# Patient Record
Sex: MALE | Race: WHITE | HISPANIC OR LATINO | Employment: UNEMPLOYED | URBAN - METROPOLITAN AREA
[De-identification: names, ages, dates, MRNs, and addresses within clinical notes are randomized per-mention and may not be internally consistent; named-entity substitution may affect disease eponyms.]

---

## 2024-08-24 ENCOUNTER — HOSPITAL ENCOUNTER (EMERGENCY)
Facility: HOSPITAL | Age: 29
Discharge: HOME | End: 2024-08-25
Attending: EMERGENCY MEDICINE

## 2024-08-24 ENCOUNTER — APPOINTMENT (EMERGENCY)
Dept: CT IMAGING | Facility: HOSPITAL | Age: 29
End: 2024-08-24

## 2024-08-24 DIAGNOSIS — R41.0 CONFUSION: ICD-10-CM

## 2024-08-24 DIAGNOSIS — R20.0 RIGHT FACIAL NUMBNESS: Primary | ICD-10-CM

## 2024-08-24 LAB
ALBUMIN SERPL BCG-MCNC: 4.8 G/DL (ref 3.5–5)
ALP SERPL-CCNC: 49 U/L (ref 34–104)
ALT SERPL W P-5'-P-CCNC: 16 U/L (ref 7–52)
AMPHETAMINES SERPL QL SCN: NEGATIVE
ANION GAP SERPL CALCULATED.3IONS-SCNC: 8 MMOL/L (ref 4–13)
APTT PPP: 27 SECONDS (ref 23–34)
AST SERPL W P-5'-P-CCNC: 21 U/L (ref 13–39)
ATRIAL RATE: 102 BPM
BARBITURATES UR QL: NEGATIVE
BENZODIAZ UR QL: NEGATIVE
BILIRUB SERPL-MCNC: 0.38 MG/DL (ref 0.2–1)
BUN SERPL-MCNC: 18 MG/DL (ref 5–25)
CALCIUM SERPL-MCNC: 9.6 MG/DL (ref 8.4–10.2)
CARDIAC TROPONIN I PNL SERPL HS: <2 NG/L
CHLORIDE SERPL-SCNC: 102 MMOL/L (ref 96–108)
CO2 SERPL-SCNC: 28 MMOL/L (ref 21–32)
COCAINE UR QL: NEGATIVE
CREAT SERPL-MCNC: 1.14 MG/DL (ref 0.6–1.3)
ERYTHROCYTE [DISTWIDTH] IN BLOOD BY AUTOMATED COUNT: 12.4 % (ref 11.6–15.1)
FENTANYL UR QL SCN: NEGATIVE
FLUAV RNA RESP QL NAA+PROBE: NEGATIVE
FLUBV RNA RESP QL NAA+PROBE: NEGATIVE
GFR SERPL CREATININE-BSD FRML MDRD: 87 ML/MIN/1.73SQ M
GLUCOSE SERPL-MCNC: 103 MG/DL (ref 65–140)
GLUCOSE SERPL-MCNC: 106 MG/DL (ref 65–140)
HCT VFR BLD AUTO: 46.1 % (ref 36.5–49.3)
HGB BLD-MCNC: 15.8 G/DL (ref 12–17)
HYDROCODONE UR QL SCN: NEGATIVE
INR PPP: 0.98 (ref 0.85–1.19)
MCH RBC QN AUTO: 29.5 PG (ref 26.8–34.3)
MCHC RBC AUTO-ENTMCNC: 34.3 G/DL (ref 31.4–37.4)
MCV RBC AUTO: 86 FL (ref 82–98)
METHADONE UR QL: NEGATIVE
OPIATES UR QL SCN: NEGATIVE
OXYCODONE+OXYMORPHONE UR QL SCN: NEGATIVE
P AXIS: 72 DEGREES
PCP UR QL: NEGATIVE
PLATELET # BLD AUTO: 313 THOUSANDS/UL (ref 149–390)
PMV BLD AUTO: 9.5 FL (ref 8.9–12.7)
POTASSIUM SERPL-SCNC: 3.5 MMOL/L (ref 3.5–5.3)
PR INTERVAL: 130 MS
PROT SERPL-MCNC: 7.7 G/DL (ref 6.4–8.4)
PROTHROMBIN TIME: 13.5 SECONDS (ref 12.3–15)
QRS AXIS: 86 DEGREES
QRSD INTERVAL: 88 MS
QT INTERVAL: 332 MS
QTC INTERVAL: 432 MS
RBC # BLD AUTO: 5.36 MILLION/UL (ref 3.88–5.62)
RSV RNA RESP QL NAA+PROBE: NEGATIVE
SARS-COV-2 RNA RESP QL NAA+PROBE: NEGATIVE
SODIUM SERPL-SCNC: 138 MMOL/L (ref 135–147)
T WAVE AXIS: 17 DEGREES
THC UR QL: NEGATIVE
VENTRICULAR RATE: 102 BPM
WBC # BLD AUTO: 11.57 THOUSAND/UL (ref 4.31–10.16)

## 2024-08-24 PROCEDURE — 70496 CT ANGIOGRAPHY HEAD: CPT

## 2024-08-24 PROCEDURE — 85730 THROMBOPLASTIN TIME PARTIAL: CPT | Performed by: EMERGENCY MEDICINE

## 2024-08-24 PROCEDURE — 84484 ASSAY OF TROPONIN QUANT: CPT | Performed by: EMERGENCY MEDICINE

## 2024-08-24 PROCEDURE — 85027 COMPLETE CBC AUTOMATED: CPT | Performed by: EMERGENCY MEDICINE

## 2024-08-24 PROCEDURE — 80053 COMPREHEN METABOLIC PANEL: CPT | Performed by: EMERGENCY MEDICINE

## 2024-08-24 PROCEDURE — 0241U HB NFCT DS VIR RESP RNA 4 TRGT: CPT | Performed by: EMERGENCY MEDICINE

## 2024-08-24 PROCEDURE — 70498 CT ANGIOGRAPHY NECK: CPT

## 2024-08-24 PROCEDURE — 93005 ELECTROCARDIOGRAM TRACING: CPT

## 2024-08-24 PROCEDURE — 99285 EMERGENCY DEPT VISIT HI MDM: CPT

## 2024-08-24 PROCEDURE — 80307 DRUG TEST PRSMV CHEM ANLYZR: CPT | Performed by: EMERGENCY MEDICINE

## 2024-08-24 PROCEDURE — 82948 REAGENT STRIP/BLOOD GLUCOSE: CPT

## 2024-08-24 PROCEDURE — 85610 PROTHROMBIN TIME: CPT | Performed by: EMERGENCY MEDICINE

## 2024-08-24 PROCEDURE — 99285 EMERGENCY DEPT VISIT HI MDM: CPT | Performed by: EMERGENCY MEDICINE

## 2024-08-24 PROCEDURE — 36415 COLL VENOUS BLD VENIPUNCTURE: CPT | Performed by: EMERGENCY MEDICINE

## 2024-08-24 RX ADMIN — IOHEXOL 85 ML: 350 INJECTION, SOLUTION INTRAVENOUS at 22:38

## 2024-08-24 NOTE — Clinical Note
Wily Messina was seen and treated in our emergency department on 8/24/2024.            no work until follow-up    Diagnosis:     Wily  .    He may return on this date:          If you have any questions or concerns, please don't hesitate to call.      Catracho Schwab, DO    ______________________________           _______________          _______________  Hospital Representative                              Date                                Time

## 2024-08-25 VITALS
SYSTOLIC BLOOD PRESSURE: 137 MMHG | OXYGEN SATURATION: 97 % | WEIGHT: 166.89 LBS | RESPIRATION RATE: 19 BRPM | HEART RATE: 66 BPM | DIASTOLIC BLOOD PRESSURE: 63 MMHG | TEMPERATURE: 99.4 F

## 2024-08-25 NOTE — ED PROVIDER NOTES
"History  Chief Complaint   Patient presents with    Altered Mental Status     Patient presents to ED for intermittent confusion and right sided  face \"tightness\" x months, more frequent this past week.  Has outpatient neurology appointment scheduled.  Current symptoms started 1930.  Patient reports confusion is getting better but not completely resolved. At time of triage, patient answering all questions appropriately, Patient reports right side of face feels different than left.      Hx from patient and girlfriend - no sig pmh but does have FH of strokes in people at a younger age.  Patient concerned with confusion and right face numbness intermittent for a few months now.  This episode started 45 minutes ago.  He was confused about what pedal was the brake and what was the gas in his vehicle.  He admits to generalized headaches, seasonal allergies, bilateral ear fullness as well over last few months.  He denies anxiety or illicit drug use.  He did not take anything yet for symptoms.  He has an appointment with ENT next week.  Girlfriend states that he does snore frequently at night.        None       History reviewed. No pertinent past medical history.    History reviewed. No pertinent surgical history.    History reviewed. No pertinent family history.  I have reviewed and agree with the history as documented.    E-Cigarette/Vaping     E-Cigarette/Vaping Substances          Review of Systems   Constitutional:  Negative for chills and fever.   HENT:  Positive for congestion and sinus pressure. Negative for ear pain, rhinorrhea, sore throat and tinnitus.    Respiratory:  Negative for shortness of breath.    Cardiovascular:  Negative for chest pain.   Gastrointestinal:  Negative for constipation, diarrhea, nausea and vomiting.   Genitourinary:  Negative for dysuria and frequency.   Skin:  Negative for rash.   Neurological:  Positive for numbness and headaches. Negative for speech difficulty and weakness. "   Psychiatric/Behavioral:  The patient is not nervous/anxious.    All other systems reviewed and are negative.      Physical Exam  Physical Exam  Vitals and nursing note reviewed.   Constitutional:       Appearance: He is well-developed.   HENT:      Head: Normocephalic and atraumatic.      Right Ear: Tympanic membrane and external ear normal.      Left Ear: Tympanic membrane and external ear normal.      Nose: Nose normal. No nasal tenderness or mucosal edema.      Right Nostril: No occlusion.      Left Nostril: No occlusion.      Mouth/Throat:      Mouth: Mucous membranes are moist.      Pharynx: Oropharynx is clear.   Eyes:      General: No visual field deficit.     Extraocular Movements: Extraocular movements intact.      Conjunctiva/sclera: Conjunctivae normal.      Pupils: Pupils are equal, round, and reactive to light.   Cardiovascular:      Rate and Rhythm: Normal rate and regular rhythm.      Heart sounds: Normal heart sounds.   Pulmonary:      Effort: Pulmonary effort is normal. No respiratory distress.      Breath sounds: Normal breath sounds. No wheezing.   Abdominal:      General: Bowel sounds are normal. There is no distension.      Palpations: Abdomen is soft.      Tenderness: There is no abdominal tenderness.   Musculoskeletal:         General: No deformity. Normal range of motion.      Cervical back: Normal range of motion and neck supple. No spinous process tenderness.   Skin:     General: Skin is warm and dry.      Findings: No rash.   Neurological:      General: No focal deficit present.      Mental Status: He is alert.      GCS: GCS eye subscore is 4. GCS verbal subscore is 5. GCS motor subscore is 6.      Cranial Nerves: No cranial nerve deficit, dysarthria or facial asymmetry.      Sensory: No sensory deficit.      Motor: No weakness.      Coordination: Romberg sign negative. Coordination normal.   Psychiatric:         Mood and Affect: Mood normal.         Vital Signs  ED Triage Vitals  [08/24/24 2050]   Temperature Pulse Respirations Blood Pressure SpO2   99.4 °F (37.4 °C) (!) 120 20 161/99 100 %      Temp Source Heart Rate Source Patient Position - Orthostatic VS BP Location FiO2 (%)   Temporal Monitor Sitting Right arm --      Pain Score       --           Vitals:    08/24/24 2100 08/24/24 2200 08/24/24 2300 08/24/24 2330   BP: 151/76 136/69 134/73 137/63   Pulse: 84 77 79 66   Patient Position - Orthostatic VS:             Visual Acuity  Visual Acuity      Flowsheet Row Most Recent Value   L Pupil Size (mm) 3   R Pupil Size (mm) 3   L Pupil Shape Round   R Pupil Shape Round            ED Medications  Medications   iohexol (OMNIPAQUE) 350 MG/ML injection (MULTI-DOSE) 100 mL (85 mL Intravenous Given 8/24/24 2238)       Diagnostic Studies  Results Reviewed       Procedure Component Value Units Date/Time    Rapid drug screen, urine [[id number]]  (Normal) Collected: 08/24/24 2231    Lab Status: Final result Specimen: Urine, Clean Catch Updated: 08/24/24 2248     Amph/Meth UR Negative     Barbiturate Ur Negative     Benzodiazepine Urine Negative     Cocaine Urine Negative     Methadone Urine Negative     Opiate Urine Negative     PCP Ur Negative     THC Urine Negative     Oxycodone Urine Negative     Fentanyl Urine Negative     HYDROCODONE URINE Negative    Narrative:      FOR MEDICAL PURPOSES ONLY.   IF CONFIRMATION NEEDED PLEASE CONTACT THE LAB WITHIN 5 DAYS.    Drug Screen Cutoff Levels:  AMPHETAMINE/METHAMPHETAMINES  1000 ng/mL  BARBITURATES     200 ng/mL  BENZODIAZEPINES     200 ng/mL  COCAINE      300 ng/mL  METHADONE      300 ng/mL  OPIATES      300 ng/mL  PHENCYCLIDINE     25 ng/mL  THC       50 ng/mL  OXYCODONE      100 ng/mL  FENTANYL      5 ng/mL  HYDROCODONE     300 ng/mL    FLU/RSV/COVID - if FLU/RSV clinically relevant [[id number]]  (Normal) Collected: 08/24/24 2101    Lab Status: Final result Specimen: Nares from Nose Updated: 08/24/24 2144     SARS-CoV-2 Negative     INFLUENZA A PCR  Negative     INFLUENZA B PCR Negative     RSV PCR Negative    Narrative:      This test has been performed using the CoV-2/Flu/RSV plus assay on the [employer] platform. This test has been validated by the  and verified by the performing laboratory.     This test is designed to amplify and detect the following: nucleocapsid (N), envelope (E), and RNA-dependent RNA polymerase (RdRP) genes of the SARS-CoV-2 genome; matrix (M), basic polymerase (PB2), and acidic protein (PA) segments of the influenza A genome; matrix (M) and non-structural protein (NS) segments of the influenza B genome, and the nucleocapsid genes of RSV A and RSV B.     Positive results are indicative of the presence of Flu A, Flu B, RSV, and/or SARS-CoV-2 RNA. Positive results for SARS-CoV-2 or suspected novel influenza should be reported to state, local, or federal health departments according to local reporting requirements.      All results should be assessed in conjunction with clinical presentation and other laboratory markers for clinical management.     FOR PEDIATRIC PATIENTS - copy/paste COVID Guidelines URL to browser: [email or web address]       Comprehensive metabolic panel [[id number]] Collected: 08/24/24 2101    Lab Status: Final result Specimen: Blood from Arm, Right Updated: 08/24/24 2133     Sodium 138 mmol/L      Potassium 3.5 mmol/L      Chloride 102 mmol/L      CO2 28 mmol/L      ANION GAP 8 mmol/L      BUN 18 mg/dL      Creatinine 1.14 mg/dL      Glucose 103 mg/dL      Calcium 9.6 mg/dL      AST 21 U/L      ALT 16 U/L      Alkaline Phosphatase 49 U/L      Total Protein 7.7 g/dL      Albumin 4.8 g/dL      Total Bilirubin 0.38 mg/dL      eGFR 87 ml/min/1.73sq m     Narrative:      National Kidney Disease Foundation guidelines for Chronic Kidney Disease (CKD):     Stage 1 with normal or high GFR (GFR > 90 mL/min/1.73 square meters)    Stage 2 Mild CKD (GFR = 60-89  mL/min/1.73 square meters)    Stage 3A Moderate CKD (GFR = 45-59 mL/min/1.73 square meters)    Stage 3B Moderate CKD (GFR = 30-44 mL/min/1.73 square meters)    Stage 4 Severe CKD (GFR = 15-29 mL/min/1.73 square meters)    Stage 5 End Stage CKD (GFR <15 mL/min/1.73 square meters)  Note: GFR calculation is accurate only with a steady state creatinine    HS Troponin 0hr (reflex protocol) [[id number]]  (Normal) Collected: 08/24/24 2101    Lab Status: Final result Specimen: Blood from Arm, Right Updated: 08/24/24 2131     hs TnI 0hr <2 ng/L     Protime-INR [[id number]]  (Normal) Collected: 08/24/24 2101    Lab Status: Final result Specimen: Blood from Arm, Right Updated: 08/24/24 2122     Protime 13.5 seconds      INR 0.98    Narrative:      INR Therapeutic Range    Indication                                             INR Range      Atrial Fibrillation                                               2.0-3.0  Hypercoagulable State                                    2.0.2.3  Left Ventricular Asist Device                            2.0-3.0  Mechanical Heart Valve                                  -    Aortic(with afib, MI, embolism, HF, LA enlargement,    and/or coagulopathy)                                     2.0-3.0 (2.5-3.5)     Mitral                                                             2.5-3.5  Prosthetic/Bioprosthetic Heart Valve               2.0-3.0  Venous thromboembolism (VTE: VT, PE        2.0-3.0    APTT [[id number]]  (Normal) Collected: 08/24/24 2101    Lab Status: Final result Specimen: Blood from Arm, Right Updated: 08/24/24 2122     PTT 27 seconds     CBC and Platelet [[id number]]  (Abnormal) Collected: 08/24/24 2101    Lab Status: Final result Specimen: Blood from Arm, Right Updated: 08/24/24 2109     WBC 11.57 Thousand/uL      RBC 5.36 Million/uL      Hemoglobin 15.8 g/dL      Hematocrit 46.1 %      MCV 86 fL      MCH 29.5 pg      MCHC 34.3 g/dL      RDW 12.4 %      Platelets 313 Thousands/uL      MPV  9.5 fL     Fingerstick Glucose (POCT) [[id number]]  (Normal) Collected: 08/24/24 2104    Lab Status: Final result Specimen: Blood Updated: 08/24/24 2105     POC Glucose 106 mg/dl                    CTA head and neck with and without contrast   Final Result by Oren Rosenbaum MD (08/24 2318)      1. No acute intracranial hemorrhage, proximal large vessel occlusion in the head and neck or significant vascular stenosis.      2. Prominent nasopharyngeal soft tissue, which is nonspecific but can be seen in this age group. However, recommend correlation with direct visualization.      The study was marked in EPIC for immediate notification.                  Workstation performed: [id number]                    Procedures  ECG 12 Lead Documentation Only    Date/Time: 8/24/2024 11:58 PM    Performed by: Catracho Schwab DO  Authorized by: Catracho Schwab DO    Indications / Diagnosis:  Numbness right side of face, confusion  ECG reviewed by me, the ED Provider: yes    Patient location:  ED  Previous ECG:     Previous ECG:  Unavailable  Interpretation:     Interpretation: normal    Rate:     ECG rate:  102    ECG rate assessment: tachycardic    Rhythm:     Rhythm: sinus tachycardia    Ectopy:     Ectopy: none    QRS:     QRS axis:  Normal    QRS intervals:  Normal  Conduction:     Conduction: normal    ST segments:     ST segments:  Normal  T waves:     T waves: normal    Comments:      This EKG was interpreted by me.           ED Course  ED Course as of 08/25/24 0032   Sat Aug 24, 2024   2339 Reviewed imaging findings CAT scan with patient.  He does admit to some ear fullness and allergy type symptoms like sinus congestion.  His girlfriend does state that he snores frequently at night perhaps his sleep apnea component to some of his symptoms.  He works as a eisenberg.   2339 Currently having no symptoms, numbness has resolved.                    Stroke Assessment       Row Name 08/24/24 2106             NIH Stroke Scale    Interval  Baseline      Level of Consciousness (1a.) 0      LOC Questions (1b.) 0      LOC Commands (1c.) 0      Best Gaze (2.) 0      Visual (3.) 0      Facial Palsy (4.) 0      Motor Arm, Left (5a.) 0      Motor Arm, Right (5b.) 0      Motor Leg, Left (6a.) 0      Motor Leg, Right (6b.) 0      Limb Ataxia (7.) 0      Sensory (8.) 0      Best Language (9.) 0      Dysarthria (10.) 0      Extinction and Inattention (11.) (Formerly Neglect) 0      Total 0                    Flowsheet Row Most Recent Value   Thrombolytic Decision Options    Thrombolytic Decision Patient not a candidate.   Patient is not a candidate options Symptoms resolved/clearly non disabling.                                    Medical Decision Making  Diff includes stroke with facial numbness, confusion, other consideration sinusitis, allergies, sleep apnea may cause some confusion.  Other diff toxins, anxiety, will eval for infection, electrolyte abnl, anemia, cranial bleeding or masses    Amount and/or Complexity of Data Reviewed  Labs: ordered.  Radiology: ordered.    Risk  Prescription drug management.                 Disposition  Final diagnoses:   Right facial numbness   Confusion     Time reflects when diagnosis was documented in both MDM as applicable and the Disposition within this note       Time User Action Codes Description Comment    8/24/2024 11:15 PM Catracho Schwab Add [R20.0] Right facial numbness     8/24/2024 11:15 PM Catracho Schwab Add [R41.0] Confusion           ED Disposition       ED Disposition   Discharge    Condition   Stable    Date/Time   Sat Aug 24, 2024 11:39 PM    Comment   Wily Messina discharge to home/self care.                   Follow-up Information       Follow up With Specialties Details Why Contact Info    YOUR NEUROLOGIST AS SCHEDULED  Go to               There are no discharge medications for this patient.      No discharge procedures on file.    PDMP Review       None            ED Provider  Electronically Signed by              Catracho Schwab, DO  08/25/24 0032

## 2024-08-25 NOTE — DISCHARGE INSTRUCTIONS
NASOPHARYNGEAL FULLNESS NOTED ON CT SCAN - REVIEW IN FOLLOW-UP POSSIBLY ALLERGIES OR SLEEP APNEA.  CONSIDER ALLERGY MEDICATION SUCH AS CLARITIN, ANTIHISTAMINE    DO NOT OPERATE MACHINERY OR DRIVE IF YOU FEEL CONFUSED OR DIZZY

## 2024-08-28 LAB
ATRIAL RATE: 102 BPM
P AXIS: 72 DEGREES
PR INTERVAL: 130 MS
QRS AXIS: 86 DEGREES
QRSD INTERVAL: 88 MS
QT INTERVAL: 332 MS
QTC INTERVAL: 432 MS
T WAVE AXIS: 17 DEGREES
VENTRICULAR RATE: 102 BPM

## 2024-08-28 PROCEDURE — 93010 ELECTROCARDIOGRAM REPORT: CPT | Performed by: INTERNAL MEDICINE
